# Patient Record
Sex: MALE | Race: ASIAN
[De-identification: names, ages, dates, MRNs, and addresses within clinical notes are randomized per-mention and may not be internally consistent; named-entity substitution may affect disease eponyms.]

---

## 2019-09-30 ENCOUNTER — HOSPITAL ENCOUNTER (EMERGENCY)
Dept: HOSPITAL 35 - ER | Age: 64
LOS: 1 days | Discharge: HOME | End: 2019-10-01
Payer: COMMERCIAL

## 2019-09-30 VITALS — HEIGHT: 71 IN | WEIGHT: 188.01 LBS | BODY MASS INDEX: 26.32 KG/M2

## 2019-09-30 DIAGNOSIS — Z87.442: ICD-10-CM

## 2019-09-30 DIAGNOSIS — R07.9: Primary | ICD-10-CM

## 2019-09-30 DIAGNOSIS — Z88.8: ICD-10-CM

## 2019-09-30 LAB
BASOPHILS NFR BLD AUTO: 0.7 % (ref 0–2)
EOSINOPHIL NFR BLD: 1.7 % (ref 0–3)
ERYTHROCYTE [DISTWIDTH] IN BLOOD BY AUTOMATED COUNT: 14.1 % (ref 10.5–14.5)
GRANULOCYTES NFR BLD MANUAL: 37.4 % (ref 36–66)
HCT VFR BLD CALC: 39.1 % (ref 42–52)
HGB BLD-MCNC: 13 GM/DL (ref 14–18)
LYMPHOCYTES NFR BLD AUTO: 56.4 % (ref 24–44)
MCH RBC QN AUTO: 31.6 PG (ref 26–34)
MCHC RBC AUTO-ENTMCNC: 33.2 G/DL (ref 28–37)
MCV RBC: 95.2 FL (ref 80–100)
MONOCYTES NFR BLD: 3.8 % (ref 1–8)
NEUTROPHILS # BLD: 1.5 THOU/UL (ref 1.4–8.2)
PLATELET # BLD: 177 THOU/UL (ref 150–400)
RBC # BLD AUTO: 4.11 MIL/UL (ref 4.5–6)
WBC # BLD AUTO: 4.1 THOU/UL (ref 4–11)

## 2019-10-01 VITALS — DIASTOLIC BLOOD PRESSURE: 80 MMHG | SYSTOLIC BLOOD PRESSURE: 130 MMHG

## 2019-10-01 LAB
ALBUMIN SERPL-MCNC: 3.6 G/DL (ref 3.4–5)
ALT SERPL-CCNC: 14 U/L (ref 30–65)
ANION GAP SERPL CALC-SCNC: 7 MMOL/L (ref 7–16)
AST SERPL-CCNC: 12 U/L (ref 15–37)
BILIRUB SERPL-MCNC: 0.4 MG/DL
BUN SERPL-MCNC: 20 MG/DL (ref 7–18)
CALCIUM SERPL-MCNC: 8.6 MG/DL (ref 8.5–10.1)
CHLORIDE SERPL-SCNC: 104 MMOL/L (ref 98–107)
CO2 SERPL-SCNC: 30 MMOL/L (ref 21–32)
CREAT SERPL-MCNC: 1.2 MG/DL (ref 0.7–1.3)
GLUCOSE SERPL-MCNC: 111 MG/DL (ref 74–106)
MAGNESIUM SERPL-MCNC: 2 MG/DL (ref 1.8–2.4)
POTASSIUM SERPL-SCNC: 4.2 MMOL/L (ref 3.5–5.1)
PROT SERPL-MCNC: 7.3 G/DL (ref 6.4–8.2)
SODIUM SERPL-SCNC: 141 MMOL/L (ref 136–145)
TROPONIN I SERPL-MCNC: <0.06 NG/ML (ref ?–0.06)

## 2019-10-01 NOTE — EKG
[place] [address] [facility] [employer] [place]
[place]  [address]
Phone:  [phone]                    ELECTROCARDIOGRAM REPORT      
_______________________________________________________________________________
 
Name:       MICHAEL SIN                    Room #:                     CARTER MELISSA#:      [id number]     Account #:      [id number]  
Admission:  19    Attend Phys:                          
Discharge:  10/01/19    Date of Birth:  [birth year]  
                                                          Report #: [id number]
   [id number]
_______________________________________________________________________________
THIS REPORT FOR:   //name//                          
 
                         [facility]
                                       
Test Date:    2019               Test Time:    23:04:43
Pat Name:     MICHAEL SIN               Department:   
Patient ID:   [id number]            Room:          
Gender:       M                        Technician:   CALEB
:          1955-10-               Requested By: Joshua Davis
Order Number: [id number] MD:   John Torres
                                 Measurements
Intervals                              Axis          
Rate:         67                       P:            49
MT:           156                      QRS:          -49
QRSD:         103                      T:            58
QT:           423                                    
QTc:          447                                    
                           Interpretive Statements
Sinus rhythm
Left anterior fascicular block
Compared to ECG 2009 12:52:29
Left anterior fascicular block now present
 
Electronically Signed On 10-1-2019 14:11:25 CDT by John Torres
[email or web address]
 
 
 
 
 
 
 
 
 
 
 
 
 
 
 
 
 
 
  <ELECTRONICALLY SIGNED>
   By: John Torres MD        
  10/01/19     141
D: 19                           _____________________________________
T: 19                           John Torres MD          /LARISA